# Patient Record
Sex: MALE | Race: WHITE | Employment: UNEMPLOYED | URBAN - METROPOLITAN AREA
[De-identification: names, ages, dates, MRNs, and addresses within clinical notes are randomized per-mention and may not be internally consistent; named-entity substitution may affect disease eponyms.]

---

## 2017-03-10 PROBLEM — C92.10 CHRONIC MYELOID LEUKEMIA, BCR/ABL-POSITIVE, NOT HAVING ACHIEVED REMISSION (HCC): Status: ACTIVE | Noted: 2017-03-10

## 2018-10-09 ENCOUNTER — APPOINTMENT (OUTPATIENT)
Dept: CT IMAGING | Age: 53
End: 2018-10-09
Payer: MEDICARE

## 2018-10-09 ENCOUNTER — HOSPITAL ENCOUNTER (EMERGENCY)
Age: 53
Discharge: HOME | End: 2018-10-09
Payer: MEDICARE

## 2018-10-09 VITALS
DIASTOLIC BLOOD PRESSURE: 95 MMHG | OXYGEN SATURATION: 96 % | BODY MASS INDEX: 43.04 KG/M2 | TEMPERATURE: 97.8 F | SYSTOLIC BLOOD PRESSURE: 153 MMHG | WEIGHT: 283.07 LBS | RESPIRATION RATE: 16 BRPM | HEART RATE: 75 BPM

## 2018-10-09 DIAGNOSIS — S09.90XA CLOSED HEAD INJURY, INITIAL ENCOUNTER: ICD-10-CM

## 2018-10-09 DIAGNOSIS — S05.12XA PERIORBITAL CONTUSION OF LEFT EYE, INITIAL ENCOUNTER: ICD-10-CM
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
## 2018-10-09 LAB
BILIRUBIN URINE: NEGATIVE
BLOOD, URINE: NEGATIVE
CLARITY: CLEAR
COLOR: YELLOW
GLUCOSE URINE: NEGATIVE MG/DL
KETONES, URINE: NEGATIVE MG/DL
LEUKOCYTE ESTERASE, URINE: NEGATIVE
MICROSCOPIC EXAMINATION: NORMAL
NITRITE, URINE: NEGATIVE
PH UA: 7
PROTEIN UA: NEGATIVE MG/DL
SPECIFIC GRAVITY UA: 1.01
URINE REFLEX TO CULTURE: NORMAL
URINE TYPE: NORMAL
UROBILINOGEN, URINE: 0.2 E.U./DL

## 2018-10-09 PROCEDURE — 70450 CT HEAD/BRAIN W/O DYE: CPT

## 2018-10-09 PROCEDURE — 70486 CT MAXILLOFACIAL W/O DYE: CPT

## 2018-10-09 PROCEDURE — 99284 EMERGENCY DEPT VISIT MOD MDM: CPT

## 2018-10-09 PROCEDURE — 81003 URINALYSIS AUTO W/O SCOPE: CPT

## 2018-10-09 ASSESSMENT — PAIN DESCRIPTION - PAIN TYPE: TYPE: ACUTE PAIN

## 2018-10-09 ASSESSMENT — ENCOUNTER SYMPTOMS
ABDOMINAL PAIN: 0
VOMITING: 0
BACK PAIN: 0
EYE PAIN: 0
SINUS PAIN: 0
EYE REDNESS: 0
NAUSEA: 0
PHOTOPHOBIA: 0
EYE DISCHARGE: 0
FACIAL SWELLING: 1
COLOR CHANGE: 0

## 2018-10-09 ASSESSMENT — PAIN DESCRIPTION - ONSET: ONSET: GRADUAL

## 2018-10-09 ASSESSMENT — PAIN DESCRIPTION - FREQUENCY: FREQUENCY: CONTINUOUS

## 2018-10-09 ASSESSMENT — PAIN DESCRIPTION - PROGRESSION: CLINICAL_PROGRESSION: GRADUALLY WORSENING

## 2018-10-09 ASSESSMENT — PAIN SCALES - GENERAL: PAINLEVEL_OUTOF10: 5

## 2018-10-09 ASSESSMENT — PAIN DESCRIPTION - ORIENTATION: ORIENTATION: LEFT

## 2018-10-09 ASSESSMENT — PAIN DESCRIPTION - DESCRIPTORS: DESCRIPTORS: ACHING

## 2018-10-09 ASSESSMENT — PAIN DESCRIPTION - LOCATION: LOCATION: FACE

## 2018-10-09 NOTE — ED NOTES

## 2018-10-09 NOTE — ED NOTES
Pt states that he was getting up to use the restroom and he fell at the group home. He denies LOC but states that he hit his head on the bath tube. There is a large contusion to his cheek, he reports pain is a 5/10 he denies vision changes. There is a old lac to the right side of his head, the last is scabbed over.       Luiz Ruiz RN  10/09/18 1685

## 2018-10-09 NOTE — ED PROVIDER NOTES
**EVALUATED BY ADVANCED PRACTICE PROVIDER**        [facility]  eMERGENCY dEPARTMENT eNCOUnter      Pt Name: Josh Lynn  MRN: [id number]  [place] 1965  Date of evaluation: 10/9/2018  Provider: MARCUS Thomas - CNP      Chief Complaint:    Chief Complaint   Patient presents with   Princella Dove     contustion to the left face he fell on to tub        Nursing Notes, Past Medical Hx, Past Surgical Hx, Social Hx, Allergies, and Family Hx were all reviewed and agreed with or any disagreements were addressed in the HPI.    HPI:  (Location, Duration, Timing, Severity, Quality, Assoc Sx, Context, Modifying factors)  This is a  46 y.o. male with PMH significant for hydrocephalus who presents to the emergency department today complaining of pain and swelling on his left eye due to a fall. Onset was just prior to arrival.  The context was he was standing up out of his wheelchair to urinate when he fell forward and hit his face. He states he did not lose consciousness. He denies headache lightheadedness and dizziness. He denies neck or back pain. He denies visual disturbances including blurry or double vision. He denies nausea and vomiting. He states he would also like his urine checked because he gets frequent UTIs and thinks he may have another one. He denies abdominal pain or GI symptoms. Past Medical/Surgical History:      Diagnosis Date    Hydrocephal fetus-unspec     Hypertension     Seizures ([facility] [address]      History reviewed. No pertinent surgical history.     Medications:  Previous Medications    ACETAMINOPHEN (TYLENOL) 325 MG TABLET    Take 650 mg by mouth every 6 hours as needed for Pain    ATORVASTATIN (LIPITOR) 20 MG TABLET    Take 20 mg by mouth daily    BACLOFEN (LIORESAL) 10 MG TABLET    Take 10 mg by mouth 3 times daily    BISMUTH SUBSALICYLATE 237 SF/04KJ SUSP    Take by mouth as needed    CARVEDILOL (COREG) 3.125 MG TABLET    Take 3.125 mg by mouth 2 times negatives as per HPI. Except as noted above in the ROS, problem specific ROS was completed and is negative. Physical Exam:  Physical Exam   Constitutional: He is oriented to person, place, and time. Vital signs are normal. He appears well-developed and well-nourished. Non-toxic appearance. No distress. HENT:   Head: Normocephalic. Head is with contusion (left periorbital). Right Ear: Tympanic membrane and ear canal normal.   Left Ear: Tympanic membrane and ear canal normal.   Nose: Nose normal. No sinus tenderness. Hydrocephalus   Eyes: Pupils are equal, round, and reactive to light. Conjunctivae and EOM are normal. No scleral icterus.   + Strabismus   Neck: Full passive range of motion without pain. Neck supple. No JVD present. No spinous process tenderness and no muscular tenderness present. No neck rigidity. Cardiovascular: Normal rate and regular rhythm. Exam reveals no gallop and no friction rub. No murmur heard. Pulmonary/Chest: Effort normal and breath sounds normal. No respiratory distress. Abdominal: Soft. Normal appearance and bowel sounds are normal. He exhibits no distension and no mass. There is no tenderness. There is no rigidity. Musculoskeletal: Normal range of motion. Neurological: He is alert and oriented to person, place, and time. He has normal strength. No cranial nerve deficit or sensory deficit. GCS eye subscore is 4. GCS verbal subscore is 5. GCS motor subscore is 6. Skin: Skin is warm, dry and intact. No rash noted. Psychiatric: He has a normal mood and affect. Nursing note and vitals reviewed.       MEDICAL DECISION MAKING    Vitals:    Vitals:    10/09/18 1748   BP: (!) 153/95   Pulse: 75   Resp: 16   Temp: 97.8 °F (36.6 °C)   TempSrc: Oral   SpO2: 96%   Weight: 283 lb 1.1 oz (128.4 kg)       LABS:   Labs Reviewed   URINE RT REFLEX TO CULTURE    Narrative:     Performed at:  [place] [address] Phone ([phone] labs reviewed and were negative at this time or not returned at the time of this note. RADIOLOGY:   Non-plain film images such as CT, Ultrasound and MRI are read by the radiologist. MARCUS West CNP have directly visualized the radiologic plain film image(s) with the below findings:        Interpretation per the Radiologist below, if available at the time of this note:    CT HEAD WO CONTRAST   Final Result   No acute intracranial abnormality. Ventricular drainage catheter is   unchanged with dilated ventricles and unchanged right parietal extra-axial   collection. Swelling in the left facial soft tissues without visible fracture. CT Facial Bones WO Contrast   Final Result   No acute intracranial abnormality. Ventricular drainage catheter is   unchanged with dilated ventricles and unchanged right parietal extra-axial   collection. Swelling in the left facial soft tissues without visible fracture. No results found. MEDICAL DECISION MAKING / ED COURSE:      PROCEDURES:   Procedures    None    Patient was given:  Medications - No data to display    Differential Diagnosis: epidural hematoma, subdural hematoma, parenchymal brain contusion or bleed, subarachnoid hemorrhage, skull fracture, neck fracture or dislocation, other. Patient seen and examined today for Injuries from a fall. See HPI for patient presentation. Patient is hemodynamically stable, nontoxic, afebrile, and without tachycardia, tachypnea, and hypoxia. Physical exam as above. 50-year-old male lying in bed in no acute distress. He has a left periorbital contusion. Pupils are equal and reactive. He does have strabismus. No conjunctival hemorrhage. No midline spine tenderness. Neck is supple with full range of motion. Cranial nerves II through XII grossly intact. CT of the head and face without acute abnormality.   Patient is alert and oriented and I Prescriptions    No medications on file       DISCONTINUED MEDICATIONS:  Discontinued Medications    No medications on file              (Please note the MDM and HPI sections of this note were completed with a voice recognition program.  Efforts were made to edit the dictations but occasionally words are mis-transcribed.)    Electronically signed, MARCUS Cedeno CNP,           MARCUS Cedeno CNP  10/09/18 8612

## 2019-03-11 ENCOUNTER — TELEPHONE (OUTPATIENT)
Dept: ORTHOPEDIC SURGERY | Age: 54
End: 2019-03-11

## 2022-08-08 ENCOUNTER — HOSPITAL ENCOUNTER (EMERGENCY)
Age: 57
Discharge: HOME | End: 2022-08-08
Attending: EMERGENCY MEDICINE
Payer: MEDICARE

## 2022-08-08 VITALS
TEMPERATURE: 97.3 F | HEIGHT: 65 IN | SYSTOLIC BLOOD PRESSURE: 154 MMHG | DIASTOLIC BLOOD PRESSURE: 76 MMHG | HEART RATE: 70 BPM | WEIGHT: 306.1 LBS | BODY MASS INDEX: 51 KG/M2 | OXYGEN SATURATION: 95 % | RESPIRATION RATE: 16 BRPM

## 2022-08-08 DIAGNOSIS — S01.00XA OPEN WOUND OF SCALP, UNSPECIFIED OPEN WOUND TYPE, INITIAL ENCOUNTER: Primary | ICD-10-CM

## 2022-08-08 DIAGNOSIS — R58 ARTERIAL HEMORRHAGE: ICD-10-CM

## 2022-08-08 PROCEDURE — 6370000000 HC RX 637 (ALT 250 FOR IP): Performed by: EMERGENCY MEDICINE

## 2022-08-08 PROCEDURE — 12001 RPR S/N/AX/GEN/TRNK 2.5CM/<: CPT

## 2022-08-08 PROCEDURE — 99283 EMERGENCY DEPT VISIT LOW MDM: CPT

## 2022-08-08 RX ORDER — BACITRACIN, NEOMYCIN, POLYMYXIN B 400; 3.5; 5 [USP'U]/G; MG/G; [USP'U]/G
OINTMENT TOPICAL ONCE
Status: COMPLETED | OUTPATIENT
Start: 2022-08-08 | End: 2022-08-08

## 2022-08-08 RX ADMIN — BACITRACIN ZINC, NEOMYCIN, POLYMYXIN B SULFAT: 5000; 3.5; 4 OINTMENT TOPICAL at 06:41

## 2022-08-08 NOTE — DISCHARGE INSTRUCTIONS
You had a wound to your scalp that had an arterial bleed seen in the ED. The bleeding was stopped with epinephrine and a figure 8 stitch. The stitch can come out in 7 days. We would recommend to keep the wound covered to help protect it while it heals. You can wash the wound gently and pat dry twice a day, apply a thin layer of antibiotic ointment afterwards. You should have your suture (1 stitch) removed in  7 days - this can be done at the nursing home, at your primary care, or you can return to the ER if needed. Call your doctor or return to the ER for increasing redness, swelling, drainage of pus, fever, vomiting, or any other new or worsening symptoms or concerns.

## 2022-08-08 NOTE — ED PROVIDER NOTES
[address] [facility]      Pt Name: Benjamin Ames  MRN: [id number]  [place] 1965  Date of evaluation: 8/8/2022  Provider: Herve Reyes MD    CHIEF COMPLAINT     I don't know  HISTORY OF PRESENT ILLNESS  (Location/Symptom, Timing/Onset,Context/Setting, Quality, Duration, Modifying Factors, Severity). Note limiting factors. Chief Complaint   Patient presents with    Wound Check      Benjamin Ames is a 61 y.o. male who presents to the emergency department secondary to concern for bleeding head wound. On arrival the patient, his name, date of birth, age. He knows he is at a hospital.  He cannot tell me why he is here. He per the nursing home is at his mental baseline and was sent in due to his scratching at the wound on his head and causing a bleed which they could not control in the nursing home. He does endorse some tenderness when I push on this area of the scalp. He otherwise denies any pain. Past medical history noted below. He denies smoking. Aside from what is stated above denies any other symptoms or modifying factors. Nursing Notes reviewed. REVIEW OF SYSTEMS  (2-9 systems for level 4, 10 or more for level 5)   Review of Systems  Pertinent positive and negative findings as documented in the HPI; otherwise all other systems were reviewed and were negative. PAST MEDICAL HISTORY     Past Medical History:   Diagnosis Date    Hydrocephal fetus-unspec     Hypertension     Seizures ([facility] [address]        SURGICALHISTORY     No past surgical history on file.   CURRENT MEDICATIONS       Previous Medications    ACETAMINOPHEN (TYLENOL) 325 MG TABLET    Take 650 mg by mouth every 6 hours as needed for Pain    ATORVASTATIN (LIPITOR) 20 MG TABLET    Take 20 mg by mouth daily    BACLOFEN (LIORESAL) 10 MG TABLET    Take 10 mg by mouth 3 times daily    BISMUTH SUBSALICYLATE 326 HS/86SO SUSP    Take by mouth as needed    CARVEDILOL (COREG) 3.125 MG TABLET    Take 3.125 mg by mouth 2 times daily (with meals)    CLONIDINE (CATAPRES) 0.1 MG TABLET    Take 0.1 mg by mouth 2 times daily    DIAZEPAM (VALIUM) 5 MG TABLET    Take 5 mg by mouth every 6 hours as needed for Anxiety    DULOXETINE (CYMBALTA) 60 MG EXTENDED RELEASE CAPSULE    Take 60 mg by mouth daily    HYDROXYZINE (ATARAX) 50 MG TABLET    Take 50 mg by mouth 3 times daily as needed for Itching    IMATINIB (GLEEVEC) 400 MG CHEMO TABLET    Take 1 tablet by mouth daily    LAMOTRIGINE (LAMICTAL) 100 MG TABLET    Take 100 mg by mouth daily    LISINOPRIL (PRINIVIL;ZESTRIL) 5 MG TABLET    Take 5 mg by mouth daily    LOPERAMIDE (IMODIUM) 1 MG/5ML SOLUTION    Take by mouth 4 times daily as needed for Diarrhea    MAGNESIUM HYDROXIDE (MILK OF MAGNESIA CONCENTRATE) 2400 MG/10ML SUSP    Take 2,400 mg by mouth once as needed    METFORMIN (GLUCOPHAGE) 500 MG TABLET    Take 500 mg by mouth daily (with breakfast)    OXYBUTYNIN (DITROPAN XL) 15 MG EXTENDED RELEASE TABLET    Take 15 mg by mouth daily    TAMSULOSIN (FLOMAX) 0.4 MG CAPSULE    Take 0.4 mg by mouth daily      ALLERGIES     Patient has no known allergies. FAMILY HISTORY     No family history on file. SOCIAL HISTORY       Social History     Socioeconomic History    Marital status: Single   Tobacco Use    Smoking status: Never    Smokeless tobacco: Never   Substance and Sexual Activity    Alcohol use: No    Drug use: No    Sexual activity: Never     SCREENINGS         PHYSICAL EXAM  (up to 7 for level 4, 8 or more for level 5)   INITIAL VITALS: BP: (!) 152/82, Temp: 97.6 °F (36.4 °C), Heart Rate: 74, Resp: 16, SpO2: 94 %   Physical Exam  Constitutional:       Appearance: He is obese. He is not toxic-appearing or diaphoretic. HENT:      Head: Macrocephalic. Jaw: There is normal jaw occlusion. Nose: No congestion. Eyes:      General:         Right eye: No discharge. Left eye: No discharge.    Cardiovascular:      Rate and Rhythm: Normal rate.   Pulmonary:      Effort: Pulmonary effort is normal.   Abdominal:      Tenderness: There is no abdominal tenderness. Musculoskeletal:      Cervical back: No rigidity. Skin:     Capillary Refill: Capillary refill takes less than 2 seconds. Neurological:      General: No focal deficit present. Mental Status: He is alert. Mental status is at baseline. DIAGNOSTIC RESULTS   RADIOLOGY:   Interpretation per Radiologist below, if available at the time of this note:  No orders to display     LABS:  Labs Reviewed - No data to display    [address] [place] [address] and DIFFERENTIAL DIAGNOSIS/MDM:   Patient was given the following medications:  Orders Placed This Encounter   Medications    neomycin-bacitracin-polymyxin (NEOSPORIN) ointment     CONSULTS:  None    INITIAL VITALS: BP: (!) 152/82, Temp: 97.6 °F (36.4 °C), Heart Rate: 74, Resp: 16, SpO2: 94 %     Is this patient to be included in the SEP-1 Core Measure due to severe sepsis or septic shock? No   Exclusion criteria - the patient is NOT to be included for SEP-1 Core Measure due to: Infection is not suspected    Jessica Benítez is a 61 y.o. male who presents to the emergency department secondary to concern for symptoms as noted in HPI. On arrival he is Awake, alert, oriented at his mental baseline. He has his head wrapped, this was done by EMS. The wrapping was removed and it was noted he still had active arterial bleeding. Lidocaine with epi was placed at the site and direct pressure was held for 10 minutes by me. Unfortunately continued to have arterial bleeding. At that time he had a figure-of-eight stitch placed as noted by procedure note below. This did stop the bleeding. Review of medical record shows his tetanus is up-to-date so this was not updated. Discussed wound care and wrote down wound care instructions in his discharge paperwork. Called and updated the nursing home.   He will continue to be monitored here until he is picked up by transportation to go back to his nursing home. CRITICAL CARE TIME   Due to the immediate potential for life-threatening deterioration due to Active arterial bleeding, I spent 15 minutes providing critical care. There was a high probability of clinically significant/life threatening deterioration in the patient's condition which required my urgent intervention. This time excludes time spent performing procedures but includes time spent on direct patient care, history retrieval, review of the chart, and discussions with patient, family, and consultant(s). PROCEDURES:  Lac Repair    Date/Time: 8/8/2022 6:43 AM  Performed by: Madhav Rollins MD  Authorized by: Madhav Rollins MD     Consent:     Consent obtained:  Emergent situation  Universal protocol:     Patient identity confirmed:  Verbally with patient and arm band  Anesthesia:     Anesthesia method:  Local infiltration    Local anesthetic:  Lidocaine 1% WITH epi  Laceration details:     Location:  Scalp    Scalp location:  L parietal    Wound length (cm): 3 mm. Depth (mm):  3  Exploration:     Hemostasis obtained with: Figure-of-eight stitch. Treatment:     Area cleansed with:  Soap and water    Amount of cleaning:  Standard  Skin repair:     Repair method:  Sutures    Suture size:  3-0    Suture material:  Prolene    Suture technique:  Figure eight    Number of sutures:  1  Approximation:     Approximation:  Close  Repair type:     Repair type:  Simple  Post-procedure details:     Dressing:  Antibiotic ointment, non-adherent dressing and adhesive bandage    Procedure completion:  Tolerated well, no immediate complications    FINAL IMPRESSION      1. Open wound of scalp, unspecified open wound type, initial encounter    2.  Arterial hemorrhage        DISPOSITION/PLAN   DISPOSITION Decision To Discharge 08/08/2022 06:26:23 AM      PATIENT REFERRED TO:  Noel Toscano MD  [address] [place] [address]  [phone]    Call   For follow

## 2024-01-01 ENCOUNTER — HOSPITAL ENCOUNTER (EMERGENCY)
Age: 59
End: 2024-10-01
Attending: EMERGENCY MEDICINE
Payer: MEDICARE

## 2024-01-01 VITALS
WEIGHT: 261.47 LBS | DIASTOLIC BLOOD PRESSURE: 18 MMHG | OXYGEN SATURATION: 75 % | BODY MASS INDEX: 38.61 KG/M2 | SYSTOLIC BLOOD PRESSURE: 55 MMHG | TEMPERATURE: 98 F | HEART RATE: 24 BPM

## 2024-01-01 DIAGNOSIS — J96.01 ACUTE RESPIRATORY FAILURE WITH HYPOXIA: Primary | ICD-10-CM

## 2024-01-01 DIAGNOSIS — R41.82 ALTERED MENTAL STATUS, UNSPECIFIED ALTERED MENTAL STATUS TYPE: ICD-10-CM

## 2024-01-01 DIAGNOSIS — Z71.89 GOALS OF CARE, COUNSELING/DISCUSSION: ICD-10-CM

## 2024-01-01 PROCEDURE — 93005 ELECTROCARDIOGRAM TRACING: CPT | Performed by: EMERGENCY MEDICINE

## 2024-01-01 PROCEDURE — 99284 EMERGENCY DEPT VISIT MOD MDM: CPT

## 2024-07-31 ENCOUNTER — HOSPITAL ENCOUNTER (EMERGENCY)
Age: 59
Discharge: NURSING, REHAB OR LONG-TERM CARE | End: 2024-07-31
Attending: EMERGENCY MEDICINE
Payer: MEDICARE

## 2024-07-31 VITALS
OXYGEN SATURATION: 96 % | DIASTOLIC BLOOD PRESSURE: 56 MMHG | BODY MASS INDEX: 45.62 KG/M2 | RESPIRATION RATE: 14 BRPM | TEMPERATURE: 98.4 F | WEIGHT: 308 LBS | HEART RATE: 76 BPM | SYSTOLIC BLOOD PRESSURE: 110 MMHG | HEIGHT: 69 IN

## 2024-07-31 DIAGNOSIS — Z46.6 ENCOUNTER FOR FOLEY CATHETER REPLACEMENT: Primary | ICD-10-CM

## 2024-07-31 PROCEDURE — 51702 INSERT TEMP BLADDER CATH: CPT

## 2024-07-31 PROCEDURE — 99283 EMERGENCY DEPT VISIT LOW MDM: CPT

## 2024-07-31 ASSESSMENT — PAIN SCALES - GENERAL: PAINLEVEL_OUTOF10: 0

## 2024-07-31 ASSESSMENT — LIFESTYLE VARIABLES: HOW OFTEN DO YOU HAVE A DRINK CONTAINING ALCOHOL: NEVER

## 2024-07-31 ASSESSMENT — PAIN - FUNCTIONAL ASSESSMENT: PAIN_FUNCTIONAL_ASSESSMENT: 0-10

## 2024-08-01 NOTE — ED NOTES
Report given to [facility] for transport at this time. SBAR reviewed. All questions answered.

## 2024-08-01 NOTE — ED NOTES
Urinary Catheter (Pt arrived from [facility] with c/c of unable to reinsert his rodriguez catheter. )     Dr. Aponte at bedside at this time for assessment

## 2024-08-01 NOTE — ED NOTES
Pt resting in bed at this time, laying in a supine position with head of bed elevated . Call light remains in reach instructed pt how to use, and encouraged pt to call if needed assistance, no distress noted. RR even and unlabored, skin warm and dry. No needs at this time.

## 2024-08-01 NOTE — ED PROVIDER NOTES
[facility] EMERGENCY DEPARTMENT  EMERGENCY DEPARTMENT ENCOUNTER      Pt Name: Mateo Schuler  MRN: [id number]  Birthdate 1965  Date of evaluation: 7/31/2024  Provider: DESIRE GU DO    CHIEF COMPLAINT  Chief Complaint   Patient presents with    Urinary Catheter     Pt arrived from [facility] with c/c of unable to reinsert his rodriguez catheter.          This patient is at risk for a communicable infection.  Therefore, personal protection equipment consisting of a mask was worn for the exam.    HPI  Mateo Schuler is a 58 y.o. male who presents with Rodriguez catheter came out at the [facility].  Patient has multiple medical problems and is bedridden.  He can give no history.  He does not know when the Rodriguez catheter came out.  However, they report tried to replace it at the nursing home and cannot get it replaced relation of to the emergency department.    REVIEW OF SYSTEMS  All systems negative except as noted in the HPI.  Reviewed Nurses' notes and concur.   History limited due to MRDD.    No LMP for male patient.    PAST MEDICAL HISTORY  Past Medical History:   Diagnosis Date    Hydrocephal fetus-unspec     Hypertension     Seizures ([facility])        FAMILY HISTORY  History reviewed. No pertinent family history.    SOCIAL HISTORY   reports that he has never smoked. He has never used smokeless tobacco. He reports that he does not drink alcohol and does not use drugs.    SURGICAL HISTORY  History reviewed. No pertinent surgical history.    CURRENT MEDICATIONS  Current Outpatient Rx   Medication Sig Dispense Refill    Bismuth Subsalicylate 525 MG/15ML SUSP Take by mouth as needed      diazepam (VALIUM) 5 MG tablet Take 5 mg by mouth every 6 hours as needed for Anxiety      carvedilol (COREG) 3.125 MG tablet Take 3.125 mg by mouth 2 times daily (with meals)      atorvastatin (LIPITOR) 20 MG tablet Take 20 mg by mouth daily      imatinib (GLEEVEC) 400 MG chemo tablet Take 1 tablet by mouth daily 30 tablet

## 2024-08-06 ENCOUNTER — APPOINTMENT (OUTPATIENT)
Dept: GENERAL RADIOLOGY | Age: 59
End: 2024-08-06
Payer: MEDICARE

## 2024-08-06 ENCOUNTER — HOSPITAL ENCOUNTER (EMERGENCY)
Age: 59
Discharge: HOME | End: 2024-08-06
Attending: EMERGENCY MEDICINE
Payer: MEDICARE

## 2024-08-06 ENCOUNTER — APPOINTMENT (OUTPATIENT)
Dept: CT IMAGING | Age: 59
End: 2024-08-06
Payer: MEDICARE

## 2024-08-06 VITALS
TEMPERATURE: 97.2 F | DIASTOLIC BLOOD PRESSURE: 66 MMHG | BODY MASS INDEX: 41.21 KG/M2 | HEART RATE: 90 BPM | WEIGHT: 278.22 LBS | RESPIRATION RATE: 14 BRPM | SYSTOLIC BLOOD PRESSURE: 120 MMHG | HEIGHT: 69 IN | OXYGEN SATURATION: 96 %

## 2024-08-06 DIAGNOSIS — D64.9 NORMOCYTIC ANEMIA: ICD-10-CM

## 2024-08-06 DIAGNOSIS — E87.6 HYPOKALEMIA: ICD-10-CM

## 2024-08-06 DIAGNOSIS — K52.9 CHRONIC DIARRHEA: Primary | ICD-10-CM

## 2024-08-06 DIAGNOSIS — E83.42 HYPOMAGNESEMIA: ICD-10-CM

## 2024-08-06 LAB
ALBUMIN SERPL-MCNC: 3 G/DL (ref 3.4–5)
ALBUMIN/GLOB SERPL: 1 {RATIO} (ref 1.1–2.2)
ALP SERPL-CCNC: 79 U/L (ref 40–129)
ALT SERPL-CCNC: 17 U/L (ref 10–40)
ANION GAP SERPL CALCULATED.3IONS-SCNC: 10 MMOL/L (ref 3–16)
AST SERPL-CCNC: 13 U/L (ref 15–37)
BASOPHILS # BLD: 0.1 K/UL (ref 0–0.2)
BASOPHILS NFR BLD: 1.1 %
BILIRUB SERPL-MCNC: <0.2 MG/DL (ref 0–1)
BUN SERPL-MCNC: 12 MG/DL (ref 7–20)
C DIFF TOX A+B STL QL IA: NORMAL
CALCIUM SERPL-MCNC: 8.7 MG/DL (ref 8.3–10.6)
CHLORIDE SERPL-SCNC: 104 MMOL/L (ref 99–110)
CO2 SERPL-SCNC: 30 MMOL/L (ref 21–32)
CREAT SERPL-MCNC: 0.6 MG/DL (ref 0.9–1.3)
DEPRECATED RDW RBC AUTO: 16.3 % (ref 12.4–15.4)
EOSINOPHIL # BLD: 0.1 K/UL (ref 0–0.6)
EOSINOPHIL NFR BLD: 1.2 %
GFR SERPLBLD CREATININE-BSD FMLA CKD-EPI: >90 ML/MIN/{1.73_M2}
GLUCOSE SERPL-MCNC: 81 MG/DL (ref 70–99)
HCT VFR BLD AUTO: 32.7 % (ref 40.5–52.5)
HGB BLD-MCNC: 10.8 G/DL (ref 13.5–17.5)
LYMPHOCYTES # BLD: 1.9 K/UL (ref 1–5.1)
LYMPHOCYTES NFR BLD: 15.4 %
MAGNESIUM SERPL-MCNC: 1.4 MG/DL (ref 1.8–2.4)
MCH RBC QN AUTO: 30.8 PG (ref 26–34)
MCHC RBC AUTO-ENTMCNC: 33.2 G/DL (ref 31–36)
MCV RBC AUTO: 92.8 FL (ref 80–100)
MONOCYTES # BLD: 0.8 K/UL (ref 0–1.3)
MONOCYTES NFR BLD: 6.9 %
NEUTROPHILS # BLD: 9.1 K/UL (ref 1.7–7.7)
NEUTROPHILS NFR BLD: 75.4 %
PLATELET # BLD AUTO: 308 K/UL (ref 135–450)
PMV BLD AUTO: 6.7 FL (ref 5–10.5)
POTASSIUM SERPL-SCNC: 2.5 MMOL/L (ref 3.5–5.1)
POTASSIUM SERPL-SCNC: 3.1 MMOL/L (ref 3.5–5.1)
PROT SERPL-MCNC: 6 G/DL (ref 6.4–8.2)
RBC # BLD AUTO: 3.52 M/UL (ref 4.2–5.9)
SODIUM SERPL-SCNC: 144 MMOL/L (ref 136–145)
WBC # BLD AUTO: 12.1 K/UL (ref 4–11)

## 2024-08-06 PROCEDURE — 6370000000 HC RX 637 (ALT 250 FOR IP): Performed by: PHYSICIAN ASSISTANT

## 2024-08-06 PROCEDURE — 6360000002 HC RX W HCPCS: Performed by: PHYSICIAN ASSISTANT

## 2024-08-06 PROCEDURE — 70250 X-RAY EXAM OF SKULL: CPT

## 2024-08-06 PROCEDURE — 84132 ASSAY OF SERUM POTASSIUM: CPT

## 2024-08-06 PROCEDURE — 85025 COMPLETE CBC W/AUTO DIFF WBC: CPT

## 2024-08-06 PROCEDURE — 80053 COMPREHEN METABOLIC PANEL: CPT

## 2024-08-06 PROCEDURE — 87506 IADNA-DNA/RNA PROBE TQ 6-11: CPT

## 2024-08-06 PROCEDURE — 96366 THER/PROPH/DIAG IV INF ADDON: CPT

## 2024-08-06 PROCEDURE — 83735 ASSAY OF MAGNESIUM: CPT

## 2024-08-06 PROCEDURE — 6370000000 HC RX 637 (ALT 250 FOR IP): Performed by: EMERGENCY MEDICINE

## 2024-08-06 PROCEDURE — 96365 THER/PROPH/DIAG IV INF INIT: CPT

## 2024-08-06 PROCEDURE — 99284 EMERGENCY DEPT VISIT MOD MDM: CPT

## 2024-08-06 PROCEDURE — 70450 CT HEAD/BRAIN W/O DYE: CPT

## 2024-08-06 PROCEDURE — 2580000003 HC RX 258: Performed by: PHYSICIAN ASSISTANT

## 2024-08-06 PROCEDURE — 87449 NOS EACH ORGANISM AG IA: CPT

## 2024-08-06 PROCEDURE — 87324 CLOSTRIDIUM AG IA: CPT

## 2024-08-06 RX ORDER — MAGNESIUM OXIDE 400 MG/1
400 TABLET ORAL DAILY
Qty: 2 TABLET | Refills: 0 | Status: SHIPPED | OUTPATIENT
Start: 2024-08-06 | End: 2024-08-06

## 2024-08-06 RX ORDER — MAGNESIUM OXIDE 400 MG/1
400 TABLET ORAL DAILY
Qty: 2 TABLET | Refills: 0 | Status: SHIPPED | OUTPATIENT
Start: 2024-08-06 | End: 2024-08-08

## 2024-08-06 RX ORDER — MAGNESIUM SULFATE IN WATER 40 MG/ML
2000 INJECTION, SOLUTION INTRAVENOUS ONCE
Status: COMPLETED | OUTPATIENT
Start: 2024-08-06 | End: 2024-08-06

## 2024-08-06 RX ORDER — POTASSIUM CHLORIDE 20 MEQ/1
40 TABLET, EXTENDED RELEASE ORAL DAILY
Qty: 4 TABLET | Refills: 0 | Status: SHIPPED | OUTPATIENT
Start: 2024-08-06 | End: 2024-08-08

## 2024-08-06 RX ORDER — POTASSIUM CHLORIDE 7.45 MG/ML
10 INJECTION INTRAVENOUS
Status: COMPLETED | OUTPATIENT
Start: 2024-08-06 | End: 2024-08-06

## 2024-08-06 RX ORDER — 0.9 % SODIUM CHLORIDE 0.9 %
500 INTRAVENOUS SOLUTION INTRAVENOUS ONCE
Status: COMPLETED | OUTPATIENT
Start: 2024-08-06 | End: 2024-08-06

## 2024-08-06 RX ORDER — ACETAMINOPHEN 325 MG/1
650 TABLET ORAL ONCE
Status: COMPLETED | OUTPATIENT
Start: 2024-08-06 | End: 2024-08-06

## 2024-08-06 RX ORDER — ONDANSETRON 4 MG/1
4 TABLET, ORALLY DISINTEGRATING ORAL ONCE
Status: COMPLETED | OUTPATIENT
Start: 2024-08-06 | End: 2024-08-06

## 2024-08-06 RX ADMIN — POTASSIUM BICARBONATE 40 MEQ: 782 TABLET, EFFERVESCENT ORAL at 13:18

## 2024-08-06 RX ADMIN — POTASSIUM CHLORIDE 10 MEQ: 7.46 INJECTION, SOLUTION INTRAVENOUS at 14:27

## 2024-08-06 RX ADMIN — POTASSIUM BICARBONATE 20 MEQ: 782 TABLET, EFFERVESCENT ORAL at 17:30

## 2024-08-06 RX ADMIN — POTASSIUM CHLORIDE 10 MEQ: 7.46 INJECTION, SOLUTION INTRAVENOUS at 13:26

## 2024-08-06 RX ADMIN — POTASSIUM CHLORIDE 10 MEQ: 7.46 INJECTION, SOLUTION INTRAVENOUS at 15:29

## 2024-08-06 RX ADMIN — ONDANSETRON 4 MG: 4 TABLET, ORALLY DISINTEGRATING ORAL at 11:55

## 2024-08-06 RX ADMIN — POTASSIUM CHLORIDE 10 MEQ: 7.46 INJECTION, SOLUTION INTRAVENOUS at 12:34

## 2024-08-06 RX ADMIN — MAGNESIUM SULFATE HEPTAHYDRATE 2000 MG: 40 INJECTION, SOLUTION INTRAVENOUS at 12:36

## 2024-08-06 RX ADMIN — SODIUM CHLORIDE 500 ML: 9 INJECTION, SOLUTION INTRAVENOUS at 12:33

## 2024-08-06 RX ADMIN — ACETAMINOPHEN 325MG 650 MG: 325 TABLET ORAL at 11:55

## 2024-08-06 ASSESSMENT — ENCOUNTER SYMPTOMS
ABDOMINAL PAIN: 0
COUGH: 0
DIARRHEA: 1
VOMITING: 0
ANAL BLEEDING: 0
ABDOMINAL DISTENTION: 0
COLOR CHANGE: 0
SHORTNESS OF BREATH: 0
RECTAL PAIN: 0
BACK PAIN: 0
CONSTIPATION: 0
RESPIRATORY NEGATIVE: 1
BLOOD IN STOOL: 0
CHEST TIGHTNESS: 0
PHOTOPHOBIA: 0
NAUSEA: 0

## 2024-08-06 ASSESSMENT — LIFESTYLE VARIABLES
HOW OFTEN DO YOU HAVE A DRINK CONTAINING ALCOHOL: NEVER
HOW MANY STANDARD DRINKS CONTAINING ALCOHOL DO YOU HAVE ON A TYPICAL DAY: PATIENT DOES NOT DRINK

## 2024-08-06 ASSESSMENT — PAIN DESCRIPTION - LOCATION
LOCATION: HEAD
LOCATION: HEAD

## 2024-08-06 ASSESSMENT — PAIN - FUNCTIONAL ASSESSMENT
PAIN_FUNCTIONAL_ASSESSMENT: ACTIVITIES ARE NOT PREVENTED
PAIN_FUNCTIONAL_ASSESSMENT: 0-10

## 2024-08-06 ASSESSMENT — PAIN DESCRIPTION - DESCRIPTORS: DESCRIPTORS: ACHING

## 2024-08-06 ASSESSMENT — PAIN SCALES - GENERAL
PAINLEVEL_OUTOF10: 10
PAINLEVEL_OUTOF10: 10

## 2024-08-06 NOTE — ED PROVIDER NOTES
over chest.  Appears to be similar read to today's x-ray and low suspicion for malfunctioning shunt.  CT of the head showed severe pattern of chronic hydrocephalus with no interval change from remote prior head CT.  Interval placement of second ventriculostomy shunt catheter with tip in the right lateral ventricle.  There is abandoned catheter on the left with fragment of catheter noted posteriorly in the left lateral ventricle and portions of catheter noted to the left side of the head, neck, upper chest and pelvis.  Again on shunt series 7/9/2024 there is a band and catheter fragment documented over the left skull with additional abandon and discontinued shunt tubing to the left scalp and left neck.  This appears to be similar to previous read and doubt acute process.  Will replace his potassium and magnesium and replete potassium level.  Plan on discharge with improved potassium.  Please see attending provider note given end of shift.      I am the Primary Clinician of Record.  FINAL IMPRESSION      1. Chronic diarrhea    2. Hypokalemia    3. Hypomagnesemia    4. Normocytic anemia          DISPOSITION/PLAN     DISPOSITION Ed Observation 08/06/2024 04:55:30 PM      PATIENT REFERRED TO:  No follow-up provider specified.    DISCHARGE MEDICATIONS:  New Prescriptions    No medications on file       DISCONTINUED MEDICATIONS:  Discontinued Medications    No medications on file              (Please note that portions of this note were completed with a voice recognition program.  Efforts were made to edit the dictations but occasionally words are mis-transcribed.)    NATE Wilder (electronically signed)      Ernesto Alexander PA  08/06/24 3545

## 2024-08-06 NOTE — ED TRIAGE NOTES
Pt has low labs from [facility]. Pt states he has been having diarrhea for 1 week. Pt complains of severe headache.

## 2024-08-06 NOTE — DISCHARGE INSTRUCTIONS
We are sending a preinted prescription with Mr. Schuler.    Be sure to contact his primary care provider and/or the physician staffing your facility  to arrange for a follow up visit.    If he has any new or worsening issues after going leaving the hospital he is welcome back here at any time 24/7!

## 2024-08-07 LAB — GI PATHOGENS PNL STL NAA+PROBE: NORMAL

## 2024-10-01 NOTE — ED NOTES
Pt arrived to the ED [facility], pt is unresponsive with a signed DNR - CC,   pt is unresponsive with a GCS of 3, Dr Osman to bedside speaking with Zakiya Schuler pt' legal guardian per nursing home paper work

## 2024-10-01 NOTE — ED PROVIDER NOTES
without acute MI    ED COURSE/MDM    58 y.o. male presents with altered mental status.  Patient was found to be unresponsive by nursing home staff.  Upon presentation, he was quite ill-appearing, shortly after arrival he became apneic.  I have confirmed his goals of care with guardian and she has confirmed that he is DNR CC and would not want CPR or mechanical ventilation.  I informed the family that he was very ill-appearing and would likely pass soon and recommended that they come to the hospital if they wanted to see him.  Zakiya, the guardian that I spoke to is currently at the airport and unable to be here immediately but other family is coming to visit.  We will transition to comfort care.    Called by RN for rhythm change on monitor. , patient with fixed and dilated pupils, no response to stimuli, apneic with no pulse and asystole on monitor  Time of death       - History obtained from: EMS and guardian via phone  - Records reviewed: Yes, discharge summary from , history of CP, congenital hydrocephalus status post VA shunt 2024, insulin, CML hypertension with admission for complicated UTI, hematuria, hyponatremia           - Consults:   None       I, Anna Osman MD, am the primary clinician of record.       All questions were answered and the patient/family expressed understanding and agreement with the plan.     PROCEDURES  None    Critical Care:  Due to the immediate potential for life-threatening deterioration due to respiratory failure, I spent 31 minutes providing critical care.  This time excludes time spent performing procedures but includes time spent on direct patient care, history retrieval, review of the chart, and discussions with patient, family, and consultant(s).      CLINICAL IMPRESSION  1. Apnea    2. Altered mental status, unspecified altered mental status type    3. Acute respiratory failure with hypoxia        DISPOSITION   10/01/2024 05:50:32 PM     Anna LAZAR

## 2024-10-02 LAB
EKG ATRIAL RATE: 125 BPM
EKG DIAGNOSIS: NORMAL
EKG P AXIS: 64 DEGREES
EKG P-R INTERVAL: 138 MS
EKG Q-T INTERVAL: 316 MS
EKG QRS DURATION: 86 MS
EKG QTC CALCULATION (BAZETT): 456 MS
EKG R AXIS: 35 DEGREES
EKG T AXIS: 48 DEGREES
EKG VENTRICULAR RATE: 125 BPM